# Patient Record
Sex: MALE | Race: WHITE | NOT HISPANIC OR LATINO | Employment: FULL TIME | URBAN - METROPOLITAN AREA
[De-identification: names, ages, dates, MRNs, and addresses within clinical notes are randomized per-mention and may not be internally consistent; named-entity substitution may affect disease eponyms.]

---

## 2017-03-30 ENCOUNTER — OFFICE VISIT (OUTPATIENT)
Dept: FAMILY MEDICINE | Facility: CLINIC | Age: 32
End: 2017-03-30
Payer: COMMERCIAL

## 2017-03-30 VITALS
SYSTOLIC BLOOD PRESSURE: 120 MMHG | DIASTOLIC BLOOD PRESSURE: 78 MMHG | RESPIRATION RATE: 16 BRPM | BODY MASS INDEX: 27.3 KG/M2 | WEIGHT: 184.31 LBS | HEIGHT: 69 IN

## 2017-03-30 DIAGNOSIS — Z23 NEED FOR TDAP VACCINATION: Primary | ICD-10-CM

## 2017-03-30 PROCEDURE — 90715 TDAP VACCINE 7 YRS/> IM: CPT | Mod: S$GLB,,, | Performed by: FAMILY MEDICINE

## 2017-03-30 PROCEDURE — 90471 IMMUNIZATION ADMIN: CPT | Mod: S$GLB,,, | Performed by: FAMILY MEDICINE

## 2017-03-30 PROCEDURE — 99203 OFFICE O/P NEW LOW 30 MIN: CPT | Mod: 25,S$GLB,, | Performed by: FAMILY MEDICINE

## 2017-03-30 PROCEDURE — 99999 PR PBB SHADOW E&M-EST. PATIENT-LVL III: CPT | Mod: PBBFAC,,, | Performed by: FAMILY MEDICINE

## 2017-03-30 PROCEDURE — 1160F RVW MEDS BY RX/DR IN RCRD: CPT | Mod: S$GLB,,, | Performed by: FAMILY MEDICINE

## 2017-03-30 RX ORDER — FLUOXETINE HYDROCHLORIDE 40 MG/1
40 CAPSULE ORAL DAILY
Refills: 1 | COMMUNITY
Start: 2017-03-25 | End: 2017-06-02 | Stop reason: SDUPTHER

## 2017-03-30 NOTE — MR AVS SNAPSHOT
"    [facility]  [address] [place] [address]  Phone: [phone]  Fax: [phone]                  Carlos Alfredo   3/30/2017 10:00 AM   Office Visit    Description:  Male : 1985   Provider:  Jael Mendoza MD   Department:  [facility]           Reason for Visit     Need for Whooping Cough Shot           Diagnoses this Visit        Comments    Need for Tdap vaccination    -  Primary            To Do List           Goals (5 Years of Data)     None      Follow-Up and Disposition     Return if symptoms worsen or fail to improve.      [facility]     [facility] On Call Nurse Care Line -  Assistance  Unless otherwise directed by your provider, please contact [facility], our nurse care line that is available for  assistance.     Registered nurses in the [facility] On Call Center provide: appointment scheduling, clinical advisement, health education, and other advisory services.  Call: [phone] (toll free)               Medications           Message regarding Medications     Verify the changes and/or additions to your medication regime listed below are the same as discussed with your clinician today.  If any of these changes or additions are incorrect, please notify your healthcare provider.             Verify that the below list of medications is an accurate representation of the medications you are currently taking.  If none reported, the list may be blank. If incorrect, please contact your healthcare provider. Carry this list with you in case of emergency.           Current Medications     fluoxetine (PROZAC) 40 MG capsule Take 40 mg by mouth once daily.           Clinical Reference Information           Your Vitals Were     BP Resp Height Weight BMI    120/78 (BP Location: Left arm, Patient Position: Sitting, BP Method: Manual) 16 5' 9" (1.753 m) 83.6 kg (184 lb 4.9 oz) 27.22 kg/m2      Blood Pressure          Most Recent Value    BP  120/78    "   Allergies as of 3/30/2017     No Known Allergies      Immunizations Administered on Date of Encounter - 3/30/2017     Name Date Dose VIS Date Route    TDAP  Incomplete 0.5 mL 2/24/2015 Intramuscular      Orders Placed During Today's Visit      Normal Orders This Visit    Tdap Vaccine       Language Assistance Services     ATTENTION: Language assistance services are available, free of charge. Please call [phone].      ATENCIÓN: Si habla español, tiene a ortiz disposición servicios gratuitos de asistencia lingüística. Llame al [phone].     CHÚ Ý: N?u b?n nói Ti?ng Vi?t, có các d?ch v? h? tr? ngôn ng? mi?n phí dành cho b?n. G?i s? [phone].         [facility] complies with applicable Federal civil rights laws and does not discriminate on the basis of race, color, national origin, age, disability, or sex.

## 2017-03-30 NOTE — PROGRESS NOTES
Subjective:       Patient ID: Carlos Alfredo is a 31 y.o. male.    Chief Complaint: Need for Whooping Cough Shot    HPI 31 year old male here for need for tdap. Patient states he can not remember the last time he had a tetanus. His wife is 26 weeks pregnant. No recent illnesses/fevers/n/v/diarrhea. Patient does not have a history of adverse reactions to vaccines that he can remember.     Review of Systems   Constitutional: Negative for chills and fever.   Respiratory: Negative for chest tightness and shortness of breath.    Cardiovascular: Negative for chest pain and leg swelling.   Gastrointestinal: Negative for abdominal pain, diarrhea, nausea and vomiting.   Psychiatric/Behavioral: Negative for agitation, behavioral problems, self-injury and suicidal ideas.       Objective:      Vitals:    03/30/17 1005   BP: 120/78   Resp: 16     Physical Exam   Constitutional: He is oriented to person, place, and time. He appears well-developed and well-nourished. No distress.   Cardiovascular: Normal rate and regular rhythm.    Pulmonary/Chest: Effort normal. He has no wheezes.   Musculoskeletal: He exhibits no edema or tenderness.   Neurological: He is alert and oriented to person, place, and time.   Skin: He is not diaphoretic.   Psychiatric: He has a normal mood and affect. His behavior is normal. Judgment and thought content normal.   Vitals reviewed.      Assessment:       1. Need for Tdap vaccination        Plan:         1. Tdap vaccine adminstered today. Possible adverse reactions reviewed with patient.   Need for Tdap vaccination    Other orders  -     Tdap Vaccine      Return if symptoms worsen or fail to improve.

## 2017-06-02 ENCOUNTER — OFFICE VISIT (OUTPATIENT)
Dept: PSYCHIATRY | Facility: CLINIC | Age: 32
End: 2017-06-02
Payer: COMMERCIAL

## 2017-06-02 VITALS
BODY MASS INDEX: 27.25 KG/M2 | HEIGHT: 69 IN | DIASTOLIC BLOOD PRESSURE: 92 MMHG | WEIGHT: 184 LBS | HEART RATE: 77 BPM | SYSTOLIC BLOOD PRESSURE: 126 MMHG

## 2017-06-02 DIAGNOSIS — F33.42 DEPRESSION, MAJOR, RECURRENT, IN COMPLETE REMISSION: Primary | ICD-10-CM

## 2017-06-02 PROCEDURE — 99212 OFFICE O/P EST SF 10 MIN: CPT | Mod: S$GLB,,, | Performed by: PSYCHIATRY & NEUROLOGY

## 2017-06-02 PROCEDURE — 99999 PR PBB SHADOW E&M-EST. PATIENT-LVL III: CPT | Mod: PBBFAC,,, | Performed by: PSYCHIATRY & NEUROLOGY

## 2017-06-02 RX ORDER — FLUOXETINE HYDROCHLORIDE 40 MG/1
40 CAPSULE ORAL DAILY
Qty: 90 CAPSULE | Refills: 4 | Status: SHIPPED | OUTPATIENT
Start: 2017-07-02 | End: 2017-11-07 | Stop reason: SDUPTHER

## 2017-06-02 NOTE — PROGRESS NOTES
"6/2/2017 9:46 AM   Carlos Alfredo   1985   [id number]           OUTPATIENT PSYCHIATRY FOLLOW- UP VISIT    Reason for Encounter:  Carlos Alfredo, a 32 y.o. male,who presents today for follow up of depression and anxiety. . Met with patient.    Interval History and Content of Current Session:    Today,  Pt reports improved mood and that he continues to not be depressed or anxious. He states that he is excited to have a baby but also nervous since he is a first time leonor. He states that he feels he is ready though. 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 He states that his relationship with wife is stable and has not had any new complains. He feels that his medication is working well for him and denied having any side effects. No other new medical problems or medications he was started on.                 Social stressors:  Work and becoming a new dad    Psychiatric Review Of Systems - Is patient experiencing or having changes in:    Symptoms of Depression: No longer has any diminished mood or loss of interest/anhedonia; irritability, diminished energy, change in sleep, change in appetite, diminished concentration or cognition or indecisiveness, PMA/R, excessive guilt or hopelessness or worthlessness, suicidal ideations    Symptoms of CARLY: No longer has any excessive anxiety/worry/fear, more days than not, about numerous issues, difficult to control, with restlessness, fatigue, poor concentration, irritability, muscle tension, sleep disturbance; causes functionally impairing distress     Symptoms of raul or hypomania: NO elevated, expansive, or irritable mood with increased energy or activity; with inflated self-esteem or grandiosity, decreased need for sleep, increased rate of speech, FOI or racing thoughts, distractibility, increased goal directed activity or PMA, risky/disinhibited behavior    Symptoms of psychosis: NO hallucinations, delusions, " "disorganized thinking, disorganized behavior or abnormal motor behavior, or negative symptoms (diminshed emotional expression, avolition, anhedonia, alogia, asociality     Sleep: NO Problems with initiation, maintenance, early morning awakening with inability to return to sleep      Risk Parameters:  Patient reports no suicidal ideation  Patient reports no homicidal ideation  Patient reports no self-injurious behavior  Patient reports no violent behavior    Psychotropic medication review  Previous Trials-  none    Current meds-  Prozac 40mg daily    Substance use  none    Review of Systems     Past Medical, Family and Social History: The patient's past medical, family and social history have been reviewed and updated as appropriate within the electronic medical record - see encounter notes.    Compliance: yes    Side effects: None      Objective     Constitutional  Vitals:  Most recent vital signs, dated less than 90 days prior to this appointment, were reviewed.    Vitals:    06/02/17 0918   BP: (!) 126/92   Pulse: 77   Weight: 83.5 kg (184 lb)   Height: 5' 9" (1.753 m)            Laboratory Data:none noted    Medications:  Outpatient Encounter Prescriptions as of 6/2/2017   Medication Sig Dispense Refill    [START ON 7/2/2017] fluoxetine (PROZAC) 40 MG capsule Take 1 capsule (40 mg total) by mouth once daily. 90 capsule 4    [DISCONTINUED] fluoxetine (PROZAC) 40 MG capsule Take 40 mg by mouth once daily.  1     No facility-administered encounter medications on file as of 6/2/2017.        Allergy:  Review of patient's allergies indicates:  No Known Allergies    Nutritional Screening: Considering the patient's height and weight, medications, medical history and preferences, should a referral be made to the dietitian? no    Review of Systems - History obtained from the patient  General ROS: negative for - chills, fatigue or fever  Respiratory ROS: no cough, shortness of breath, or wheezing  Cardiovascular ROS: no " "chest pain or dyspnea on exertion  Gastrointestinal ROS: no abdominal pain, change in bowel habits, or black or bloody stools  Musculoskeletal ROS:no dystonia, no tremor; non-ataxic  Neurological ROS: no TIA or stroke symptoms    Mental Status Exam:  Appearance: unremarkable, age appropriate, casually dressed  Behavior/Cooperation:appropriate friendly and cooperative  Speech: appropriate rate, volume and tone spontaneous  Language: grossly intact, uses words appropriately. No aphasia or dysarthria noted  Mood: " good "  Affect:   steady, euthymic congruent with mood and appropriate to situation/content   Thought Process: normal and logical  Thought Content: normal, no suicidality, no homicidality, delusions, or paranoia  Sensorium:  Awake  Alert and Oriented: x3 grossly intact  Memory: Intact to conversation both recent and remote  Attention/concentration: appropriate for age/education and intact to conversation  Insight: Intact  Judgment:Intact      Assessment and Diagnosis   Status/Progress: Based on the examination today, the patient's problem(s) is/are improved and well controlled.  New problems have not been presented today.   Co-morbidities, Diagnostic uncertainty and Lack of compliance are not complicating management of the primary condition.  There are no active rule-out diagnoses for this patient at this time.     General Impression:       ICD-10-CM ICD-9-CM   1. Depression, major, recurrent, in complete remission F33.42 296.36       Intervention/Counseling/Treatment Plan   · Medication Management: Continue current medications.   · Prozac 40mg daily- maintenance therapy for depression, pt at high risk for re-occurrence of depressive episode. Will recommend to continue   · The treatment plan and follow up plan were reviewed with the patient.  · Discussed with patient informed consent, risks vs. benefits, alternative treatments, side effect profile and the inherent unpredictability of individual responses to " these treatments. The patient expresses understanding of the above and displays the capacity to agree with this current plan and had no other questions.  · Encouraged Patient to keep future appointments.   · Take medications as prescribed and abstain from substance abuse.   · In the event of an emergency patient was advised to go to the emergency room.    Return to Clinic: 1yr    Coordination of care /Counseling time: > than 50% of total time was spend on this including reviewing recent medication changes, medical problems and social stressors.  Add on Psychotherapy time:0  Total Face time:20mins    KAYKAY SHULTZ MD   [facility]   6/2/2017 9:46 AM

## 2017-11-07 RX ORDER — FLUTICASONE PROPIONATE 50 MCG
2 SPRAY, SUSPENSION (ML) NASAL 2 TIMES DAILY
Qty: 1 BOTTLE | Refills: 5 | Status: SHIPPED | OUTPATIENT
Start: 2017-11-07

## 2017-11-07 RX ORDER — FLUOXETINE HYDROCHLORIDE 40 MG/1
40 CAPSULE ORAL DAILY
Qty: 90 CAPSULE | Refills: 1 | Status: SHIPPED | OUTPATIENT
Start: 2017-11-07 | End: 2018-05-01

## 2018-01-15 ENCOUNTER — OFFICE VISIT (OUTPATIENT)
Dept: INTERNAL MEDICINE | Facility: CLINIC | Age: 33
End: 2018-01-15
Payer: COMMERCIAL

## 2018-01-15 VITALS
RESPIRATION RATE: 14 BRPM | TEMPERATURE: 99 F | SYSTOLIC BLOOD PRESSURE: 140 MMHG | WEIGHT: 192.25 LBS | HEIGHT: 68 IN | BODY MASS INDEX: 29.14 KG/M2 | DIASTOLIC BLOOD PRESSURE: 86 MMHG | HEART RATE: 103 BPM

## 2018-01-15 DIAGNOSIS — R05.3 COUGH, PERSISTENT: ICD-10-CM

## 2018-01-15 DIAGNOSIS — J11.1 FLU SYNDROME: Primary | ICD-10-CM

## 2018-01-15 PROCEDURE — 99213 OFFICE O/P EST LOW 20 MIN: CPT | Mod: S$GLB,,, | Performed by: INTERNAL MEDICINE

## 2018-01-15 PROCEDURE — 99999 PR PBB SHADOW E&M-EST. PATIENT-LVL III: CPT | Mod: PBBFAC,,, | Performed by: INTERNAL MEDICINE

## 2018-01-15 RX ORDER — PROMETHAZINE HYDROCHLORIDE AND CODEINE PHOSPHATE 6.25; 1 MG/5ML; MG/5ML
5 SOLUTION ORAL EVERY 6 HOURS PRN
Qty: 180 ML | Refills: 0 | Status: SHIPPED | OUTPATIENT
Start: 2018-01-15 | End: 2018-04-10

## 2018-01-15 RX ORDER — AZELASTINE 1 MG/ML
1 SPRAY, METERED NASAL 2 TIMES DAILY
Qty: 30 ML | Refills: 11 | Status: SHIPPED | OUTPATIENT
Start: 2018-01-15 | End: 2018-11-08

## 2018-01-15 RX ORDER — OSELTAMIVIR PHOSPHATE 75 MG/1
75 CAPSULE ORAL 2 TIMES DAILY
Qty: 10 CAPSULE | Refills: 0 | Status: SHIPPED | OUTPATIENT
Start: 2018-01-15 | End: 2018-01-20

## 2018-01-15 NOTE — LETTER
January 15, 2018      [facility]  [address],  [address]  [place] [address]  Phone: [phone]  Fax: [phone]       Patient: Carlos Alfredo   YOB: 1985  Date of Visit: 01/15/2018    To Whom It May Concern:    Ciera Alfredo  was at [facility] on 01/15/2018. He may return to work/school on 1/19/2018 with no restrictions. If you have any questions or concerns, or if I can be of further assistance, please do not hesitate to contact me.    Sincerely,        Ashleigh Mckeon LPN

## 2018-01-15 NOTE — PROGRESS NOTES
"Subjective:      Patient ID: Carlos Alfredo is a 32 y.o. male.    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 Feeling progressively sicker.  Has a 7 month old daughter at home.      Review of Systems   Constitutional: Positive for activity change, chills, diaphoresis and fatigue. Negative for appetite change and fever.   HENT: Positive for congestion, rhinorrhea, sinus pain, sinus pressure, sore throat and voice change.    Eyes: Negative.    Respiratory: Positive for cough. Negative for choking, chest tightness, shortness of breath and wheezing.    Cardiovascular: Negative for chest pain and palpitations.   Gastrointestinal: Negative for constipation, diarrhea, nausea and vomiting.   Endocrine: Negative.    Genitourinary: Negative.    Musculoskeletal: Positive for myalgias. Negative for neck pain and neck stiffness.   Skin: Negative.    Neurological: Positive for weakness and headaches.   Psychiatric/Behavioral: Negative.        Objective:   BP (!) 140/86 (BP Location: Left arm, Patient Position: Sitting)   Pulse 103   Temp 98.6 °F (37 °C) (Oral)   Resp 14   Ht 5' 8" (1.727 m)   Wt 87.2 kg (192 lb 3.9 oz)   BMI 29.23 kg/m²     Physical Exam   Constitutional: He is oriented to person, place, and time. He appears well-developed and well-nourished. He appears ill. He appears distressed.   HENT:   Head: Normocephalic and atraumatic.   Right Ear: Tympanic membrane, external ear and ear canal normal.   Left Ear: Tympanic membrane, external ear and ear canal normal.   Nose: Nose normal.   Mouth/Throat: Uvula is midline and mucous membranes are normal. Posterior oropharyngeal edema and posterior oropharyngeal erythema present.   Eyes: Conjunctivae are normal. Pupils are equal, round, and reactive to light.   Neck: Normal range of motion.   Cardiovascular: " Normal rate, regular rhythm and normal heart sounds.    Pulmonary/Chest: Effort normal and breath sounds normal. No stridor.   Abdominal: Soft. Bowel sounds are normal.   Musculoskeletal: Normal range of motion.   Lymphadenopathy:     He has no cervical adenopathy.   Neurological: He is alert and oriented to person, place, and time.   Skin: Skin is warm and dry. There is pallor.   Psychiatric: He has a normal mood and affect. His behavior is normal.   Nursing note and vitals reviewed.      Assessment:     1. Flu syndrome    2. Cough, persistent    Precautions given.  Plan:     Flu syndrome  -     oseltamivir (TAMIFLU) 75 MG capsule; Take 1 capsule (75 mg total) by mouth 2 (two) times daily.  Dispense: 10 capsule; Refill: 0  -     azelastine (ASTELIN) 137 mcg (0.1 %) nasal spray; 1 spray (137 mcg total) by Nasal route 2 (two) times daily.  Dispense: 30 mL; Refill: 11    Cough, persistent  -     promethazine-codeine 6.25-10 mg/5 ml (PHENERGAN WITH CODEINE) 6.25-10 mg/5 mL syrup; Take 5 mLs by mouth every 6 (six) hours as needed for Cough.  Dispense: 180 mL; Refill: 0    Isolate,rest,fluids,gargles, nasal decongestants,tylenol,ibuprofen alternating.

## 2018-01-16 ENCOUNTER — PATIENT MESSAGE (OUTPATIENT)
Dept: INTERNAL MEDICINE | Facility: CLINIC | Age: 33
End: 2018-01-16

## 2018-01-17 ENCOUNTER — TELEPHONE (OUTPATIENT)
Dept: INTERNAL MEDICINE | Facility: CLINIC | Age: 33
End: 2018-01-17

## 2018-01-17 RX ORDER — CEFUROXIME AXETIL 500 MG/1
500 TABLET ORAL EVERY 12 HOURS
Qty: 20 TABLET | Refills: 0 | Status: SHIPPED | OUTPATIENT
Start: 2018-01-17 | End: 2018-04-10

## 2018-01-17 NOTE — TELEPHONE ENCOUNTER
Patient states that he can't take Tamiflu. It caused him to vomit 6 times. He believes that he has a sinus infection and would like to know if you can call in antibiotics? Please advise

## 2018-01-17 NOTE — TELEPHONE ENCOUNTER
----- Message from Madelin Brown sent at 1/17/2018  8:49 AM CST -----  Contact: [phone]/ self   Pt called stating she was seen on 01/15/18 and was prescribed Tamiflu . Pt states he took the medication and felt sick to his stomach . Pt want to know if something else can be sent tot his pharmacy . Please advise

## 2018-04-10 ENCOUNTER — PATIENT MESSAGE (OUTPATIENT)
Dept: FAMILY MEDICINE | Facility: CLINIC | Age: 33
End: 2018-04-10

## 2018-04-10 ENCOUNTER — OFFICE VISIT (OUTPATIENT)
Dept: FAMILY MEDICINE | Facility: CLINIC | Age: 33
End: 2018-04-10
Payer: COMMERCIAL

## 2018-04-10 VITALS
SYSTOLIC BLOOD PRESSURE: 120 MMHG | WEIGHT: 176.88 LBS | OXYGEN SATURATION: 98 % | HEIGHT: 68 IN | DIASTOLIC BLOOD PRESSURE: 82 MMHG | BODY MASS INDEX: 26.81 KG/M2 | HEART RATE: 68 BPM

## 2018-04-10 DIAGNOSIS — E78.2 HYPERLIPIDEMIA, MIXED: Primary | ICD-10-CM

## 2018-04-10 DIAGNOSIS — F33.42 DEPRESSION, MAJOR, RECURRENT, IN COMPLETE REMISSION: ICD-10-CM

## 2018-04-10 DIAGNOSIS — Z83.3 FAMILY HISTORY OF DIABETES MELLITUS IN MOTHER: ICD-10-CM

## 2018-04-10 PROCEDURE — 99213 OFFICE O/P EST LOW 20 MIN: CPT | Mod: S$GLB,,, | Performed by: INTERNAL MEDICINE

## 2018-04-10 PROCEDURE — 99999 PR PBB SHADOW E&M-EST. PATIENT-LVL III: CPT | Mod: PBBFAC,,, | Performed by: INTERNAL MEDICINE

## 2018-04-10 RX ORDER — ROSUVASTATIN CALCIUM 10 MG/1
10 TABLET, COATED ORAL DAILY
Qty: 30 TABLET | Refills: 5 | Status: SHIPPED | OUTPATIENT
Start: 2018-04-10 | End: 2018-09-27 | Stop reason: SDUPTHER

## 2018-04-10 NOTE — PROGRESS NOTES
Subjective:       Patient ID: Carlos Alfredo is a 32 y.o. male.    Chief Complaint: Hyperlipidemia    This is a new patient to me.  I have reviewed the PMH, SH and FH for this patient. He has a history of high cholesterol. He recently had a screen at work which revealed that he had very high cholesterol of 292 with TG's over 600 and LDL unable to calculate. He said that he had some fried chicken the night before. He is fasting today. He has never taken medicine for high cholesterol. He does have a family history of diabetes in his mother.       Hyperlipidemia   This is a chronic problem. The current episode started more than 1 year ago. The problem is uncontrolled. Recent lipid tests were reviewed and are high. He has no history of chronic renal disease, diabetes, hypothyroidism, liver disease, obesity or nephrotic syndrome. Factors aggravating his hyperlipidemia include fatty foods. Pertinent negatives include no chest pain, focal sensory loss, focal weakness, leg pain, myalgias or shortness of breath. He is currently on no antihyperlipidemic treatment. Risk factors for coronary artery disease include male sex.     Review of Systems   Constitutional: Negative for activity change, chills, fatigue, fever and unexpected weight change.   HENT: Negative for congestion, ear discharge, ear pain, hearing loss, rhinorrhea, sore throat and trouble swallowing.    Eyes: Negative for discharge, redness, itching and visual disturbance.   Respiratory: Negative for cough, chest tightness, shortness of breath and wheezing.    Cardiovascular: Negative for chest pain, palpitations and leg swelling.   Gastrointestinal: Negative for abdominal pain, blood in stool, constipation, diarrhea, nausea and vomiting.   Endocrine: Negative for cold intolerance, heat intolerance, polydipsia and polyuria.   Genitourinary: Negative for difficulty urinating, dysuria, flank pain, frequency, hematuria and urgency.   Musculoskeletal: Negative for  arthralgias, back pain, joint swelling, myalgias and neck pain.   Skin: Negative for color change and rash.   Neurological: Negative for dizziness, tremors, focal weakness, weakness, numbness and headaches.   Psychiatric/Behavioral: Negative for confusion, dysphoric mood and sleep disturbance. The patient is not nervous/anxious.        Objective:      Physical Exam   Constitutional: He appears well-developed and well-nourished.   HENT:   Head: Normocephalic and atraumatic.   Right Ear: External ear normal. Tympanic membrane is not erythematous. No middle ear effusion.   Left Ear: External ear normal. Tympanic membrane is not erythematous.  No middle ear effusion.   Mouth/Throat: No posterior oropharyngeal edema or posterior oropharyngeal erythema. No tonsillar exudate.   Eyes: Conjunctivae and EOM are normal. Pupils are equal, round, and reactive to light.   Neck: No thyromegaly present.   Cardiovascular: Normal rate, regular rhythm, normal heart sounds and intact distal pulses.    No murmur heard.  Pulmonary/Chest: Effort normal and breath sounds normal. He has no wheezes.   Abdominal: He exhibits no distension. There is no tenderness.   Musculoskeletal: He exhibits no edema or tenderness.   Lymphadenopathy:     He has no cervical adenopathy.   Neurological: He displays normal reflexes. No cranial nerve deficit.   Skin: Skin is warm and dry. No rash noted.   Psychiatric: He has a normal mood and affect. His behavior is normal.       Assessment and Plan:     Problem List Items Addressed This Visit     Depression, major, recurrent, in complete remission - HE sees psychiatry. He is doing well on prozac.     Hyperlipidemia, mixed - Primary (chronic) - we will repeat levels . I would like to go ahead and start him on crestor. We will recheck in 1 month.     Relevant Orders    Comprehensive metabolic panel (Completed)    Lipid panel    Family history of diabetes mellitus in mother - we will check blood chemistries.        Other Visit Diagnoses     BMI 26.0-26.9,adult    - work on diet.

## 2018-04-10 NOTE — PATIENT INSTRUCTIONS
We will check your cholesterol and a metabolic panel again today. I have prescribed crestor once a day in the evening. Please schedule to see me in a month for follow-up.

## 2018-05-01 ENCOUNTER — OFFICE VISIT (OUTPATIENT)
Dept: PSYCHIATRY | Facility: CLINIC | Age: 33
End: 2018-05-01
Payer: COMMERCIAL

## 2018-05-01 VITALS
BODY MASS INDEX: 26.51 KG/M2 | DIASTOLIC BLOOD PRESSURE: 86 MMHG | HEIGHT: 68 IN | WEIGHT: 174.94 LBS | HEART RATE: 71 BPM | SYSTOLIC BLOOD PRESSURE: 132 MMHG

## 2018-05-01 DIAGNOSIS — F33.42 DEPRESSION, MAJOR, RECURRENT, IN COMPLETE REMISSION: Primary | ICD-10-CM

## 2018-05-01 PROCEDURE — 99213 OFFICE O/P EST LOW 20 MIN: CPT | Mod: S$GLB,,, | Performed by: PSYCHIATRY & NEUROLOGY

## 2018-05-01 PROCEDURE — 99999 PR PBB SHADOW E&M-EST. PATIENT-LVL III: CPT | Mod: PBBFAC,,, | Performed by: PSYCHIATRY & NEUROLOGY

## 2018-05-01 PROCEDURE — 90833 PSYTX W PT W E/M 30 MIN: CPT | Mod: S$GLB,,, | Performed by: PSYCHIATRY & NEUROLOGY

## 2018-05-01 RX ORDER — FLUOXETINE HYDROCHLORIDE 40 MG/1
40 CAPSULE ORAL DAILY
Qty: 90 CAPSULE | Refills: 1 | Status: SHIPPED | OUTPATIENT
Start: 2018-05-01 | End: 2018-10-31 | Stop reason: SDUPTHER

## 2018-05-01 NOTE — PROGRESS NOTES
5/1/2018 9:07 AM   Carlos Alfredo   1985   [id number]           OUTPATIENT PSYCHIATRY FOLLOW- UP VISIT    Reason for Encounter:  Carlos Alfredo, a 33 y.o. male,who presents today for follow up of   Chief Complaint   Patient presents with    Depression   .  Met with patient.    Interval History and Content of Current Session:    Today,  Pt reports  He is doing well discussed how its stressful having a new baby and complains of sleep depravation. States that he is experiencing some irritability towards wife especially when both are sleep depraved.   He states that he is doing well and is afraid to get off the Prozac. Encouraged pt now may not be an opportune time to d/c prozac as he has increased stress with care take of the baby. Discussed potential risks of long term use and risks vs. Benefits.               Social stressors:  Having a 11 month old baby      PSYCHOTHERAPY ADD-ON +[id number]   30 (16-37*) minutes    Time: 16  minutes  Participants: Met with patient    Therapeutic Intervention Type: behavior modifying psychotherapy, supportive psychotherapy  Why chosen therapy is appropriate versus another modality: relevant to diagnosis, patient responds to this modality, evidence based practice    Target symptoms: adjustment, relationship conflicts  Psychotherapeutic techniques: supportive insight oriented and CBT    Outcome monitoring methods: self-report, observation    Patient's response to intervention:  The patient's response to intervention is accepting, motivated.    Progress toward goals:  The patient's progress toward goals is good.    Psychiatric Review Of Systems - Is patient experiencing or having changes in:    Symptoms of Depression: No longer has any diminished mood or loss of interest/anhedonia; irritability, diminished energy, change in sleep, change in appetite, diminished concentration or cognition or indecisiveness, PMA/R, excessive guilt or hopelessness or worthlessness, suicidal  "ideations     Symptoms of CARLY: No longer has any excessive anxiety/worry/fear, more days than not, about numerous issues, difficult to control, with restlessness, fatigue, poor concentration, irritability, muscle tension, sleep disturbance; causes functionally impairing distress      Symptoms of raul or hypomania: NO elevated, expansive, or irritable mood with increased energy or activity; with inflated self-esteem or grandiosity, decreased need for sleep, increased rate of speech, FOI or racing thoughts, distractibility, increased goal directed activity or PMA, risky/disinhibited behavior     Symptoms of psychosis: NO hallucinations, delusions, disorganized thinking, disorganized behavior or abnormal motor behavior, or negative symptoms (diminshed emotional expression, avolition, anhedonia, alogia, asociality      Sleep: NO Problems with initiation, maintenance, early morning awakening with inability to return to sleep        Risk Parameters:  Patient reports no suicidal ideation  Patient reports no homicidal ideation  Patient reports no self-injurious behavior  Patient reports no violent behavior     Psychotropic medication review  Previous Trials-  none     Current meds-  Prozac 40mg daily    Review of Systems     Past Medical, Family and Social History: The patient's past medical, family and social history have been reviewed and updated as appropriate within the electronic medical record - see encounter notes.    Compliance: yes    Side effects: None      Objective     Constitutional  Vitals:  Most recent vital signs, dated less than 90 days prior to this appointment, were reviewed.    Vitals:    05/01/18 0848   BP: 132/86   Pulse: 71   Weight: 79.3 kg (174 lb 15 oz)   Height: 5' 8" (1.727 m)            Laboratory Data: reviewed most recent labs and noted any abnormalities.    Medications:  Outpatient Encounter Prescriptions as of 5/1/2018   Medication Sig Dispense Refill    azelastine (ASTELIN) 137 mcg (0.1 " "%) nasal spray 1 spray (137 mcg total) by Nasal route 2 (two) times daily. 30 mL 11    FLUoxetine (PROZAC) 40 MG capsule Take 1 capsule (40 mg total) by mouth once daily. 90 capsule 1    fluticasone (FLONASE) 50 mcg/actuation nasal spray 2 sprays by Each Nare route 2 (two) times daily. 1 Bottle 5    rosuvastatin (CRESTOR) 10 MG tablet Take 1 tablet (10 mg total) by mouth once daily. 30 tablet 5     No facility-administered encounter medications on file as of 5/1/2018.        Allergy:  Review of patient's allergies indicates:  No Known Allergies    Nutritional Screening: Considering the patient's height and weight, medications, medical history and preferences, should a referral be made to the dietitian? no    Review of Systems - History obtained from the patient  General ROS: negative  Respiratory ROS: no cough, shortness of breath, or wheezing  Cardiovascular ROS: no chest pain or dyspnea on exertion  Gastrointestinal ROS: no abdominal pain, change in bowel habits, or black or bloody stools  Musculoskeletal ROS:no dystonia, no tremor; non-ataxic  Neurological ROS: no TIA or stroke symptoms    Mental Status Exam:  Appearance: unremarkable, age appropriate, casually dressed  Behavior/Cooperation:appropriate friendly and cooperative   Speech: appropriate rate, volume and tone spontaneous  Language: uses words appropriately; NO aphasia or dysarthria  Mood: "  ok  "  Affect:   euthymic congruent with mood and appropriate to situation/content   Thought Process:  normal and logical  Thought Content: normal, no suicidality, no homicidality, delusions, or paranoia  Sensorium:  Awake  Alert and Oriented: x3 grossly intact  Memory: Intact to conversation both recent and remote  Attention/concentration: appropriate for age/education and intact to conversation  Insight: Intact  Judgment:Intact      Assessment and Diagnosis   Status/Progress: Based on the examination today, the patient's problem(s) is/are well controlled.  New " problems have been presented today.   Co-morbidities are not complicating management of the primary condition.  There are no active rule-out diagnoses for this patient at this time.     General Impression:       ICD-10-CM ICD-9-CM   1. Depression, major, recurrent, in complete remission F33.42 296.36       Intervention/Counseling/Treatment Plan   · Medication Management: -  · Continue current med regimen  · Labs: reviewed most recent  · The treatment plan and follow up plan were reviewed with the patient.  · Discussed with patient informed consent, risks vs. benefits, alternative treatments, side effect profile and the inherent unpredictability of individual responses to these treatments. The patient expresses understanding of the above and displays the capacity to agree with this current plan and had no other questions.  · Encouraged Patient to keep future appointments.   · Take medications as prescribed and abstain from substance abuse.   · In the event of an emergency patient was advised to go to the emergency room.    Return to Clinic: 6 months    > than 50% of total time spend on coordination of care and counseling   (which included pts differential diagnosis and prognosis for psychiatric conditions, risks, benefits of treatments, instructions and adherence to treatment plan, risk reduction, reviewing current psychiatric medication regimen, medical problems and social stressors. In addtion to possible discussion with other healthcare provider/s)    Add on Psychotherapy time:16mins  Total Face time:30mins    KAYKAY SHULTZ MD   [facility]   5/1/2018 9:07 AM

## 2018-05-08 ENCOUNTER — OFFICE VISIT (OUTPATIENT)
Dept: FAMILY MEDICINE | Facility: CLINIC | Age: 33
End: 2018-05-08
Payer: COMMERCIAL

## 2018-05-08 VITALS
OXYGEN SATURATION: 98 % | HEART RATE: 75 BPM | TEMPERATURE: 98 F | BODY MASS INDEX: 26.12 KG/M2 | WEIGHT: 172.31 LBS | DIASTOLIC BLOOD PRESSURE: 80 MMHG | HEIGHT: 68 IN | SYSTOLIC BLOOD PRESSURE: 120 MMHG

## 2018-05-08 DIAGNOSIS — R79.89 ABNORMAL LIVER FUNCTION TESTS: ICD-10-CM

## 2018-05-08 DIAGNOSIS — F33.42 DEPRESSION, MAJOR, RECURRENT, IN COMPLETE REMISSION: ICD-10-CM

## 2018-05-08 DIAGNOSIS — E78.2 HYPERLIPIDEMIA, MIXED: Primary | ICD-10-CM

## 2018-05-08 DIAGNOSIS — Z79.899 ENCOUNTER FOR LONG-TERM (CURRENT) USE OF MEDICATIONS: ICD-10-CM

## 2018-05-08 PROCEDURE — 3008F BODY MASS INDEX DOCD: CPT | Mod: CPTII,S$GLB,, | Performed by: INTERNAL MEDICINE

## 2018-05-08 PROCEDURE — 99213 OFFICE O/P EST LOW 20 MIN: CPT | Mod: S$GLB,,, | Performed by: INTERNAL MEDICINE

## 2018-05-08 PROCEDURE — 99999 PR PBB SHADOW E&M-EST. PATIENT-LVL III: CPT | Mod: PBBFAC,,, | Performed by: INTERNAL MEDICINE

## 2018-05-08 NOTE — PROGRESS NOTES
Subjective:       Patient ID: Carlos Alfredo is a 33 y.o. male.    Chief Complaint: Hyperlipidemia (f/u)     I have reviewed the PMH, SH and FH for this patient. He presents today for follow-up of hyperlipidemia and abnormal liver tests. We started him on crestor last time because his ldl was 162. HE has been doing well on it. He states that he does not drink alcohol or use a lot of tylenol, so he does not know why he would have high LFT's. He has been working on diet and has lost 20 pounds in the last 4 months. HE has been seeing a counselor for depression and he still takes prozac.       Hyperlipidemia   This is a chronic problem. The current episode started more than 1 year ago. Recent lipid tests were reviewed and are high. Exacerbating diseases include liver disease. There are no known factors aggravating his hyperlipidemia. Pertinent negatives include no chest pain, myalgias or shortness of breath. Current antihyperlipidemic treatment includes statins. There are no compliance problems.  Risk factors for coronary artery disease include male sex.     Review of Systems   Constitutional: Negative for activity change, chills, fatigue, fever and unexpected weight change.   HENT: Negative for congestion, ear discharge, ear pain, hearing loss, rhinorrhea, sore throat and trouble swallowing.    Eyes: Negative for discharge, redness, itching and visual disturbance.   Respiratory: Negative for cough, chest tightness, shortness of breath and wheezing.    Cardiovascular: Negative for chest pain, palpitations and leg swelling.   Gastrointestinal: Negative for abdominal pain, blood in stool, constipation, diarrhea, nausea and vomiting.   Endocrine: Negative for cold intolerance, heat intolerance, polydipsia and polyuria.   Genitourinary: Negative for difficulty urinating, dysuria, flank pain, frequency, hematuria and urgency.   Musculoskeletal: Negative for arthralgias, back pain, joint swelling, myalgias and neck pain.    Skin: Negative for color change and rash.   Neurological: Negative for dizziness, tremors, weakness, numbness and headaches.   Psychiatric/Behavioral: Negative for confusion, dysphoric mood and sleep disturbance. The patient is not nervous/anxious.        Objective:      Physical Exam   Constitutional: He appears well-developed and well-nourished.   HENT:   Head: Normocephalic and atraumatic.   Right Ear: External ear normal. Tympanic membrane is not erythematous. No middle ear effusion.   Left Ear: External ear normal. Tympanic membrane is not erythematous.  No middle ear effusion.   Mouth/Throat: No posterior oropharyngeal edema or posterior oropharyngeal erythema. No tonsillar exudate.   Eyes: Conjunctivae and EOM are normal. Pupils are equal, round, and reactive to light.   Neck: No thyromegaly present.   Cardiovascular: Normal rate, regular rhythm, normal heart sounds and intact distal pulses.    No murmur heard.  Pulmonary/Chest: Effort normal and breath sounds normal. He has no wheezes.   Abdominal: He exhibits no distension. There is no tenderness.   Musculoskeletal: He exhibits no edema or tenderness.   Lymphadenopathy:     He has no cervical adenopathy.   Neurological: He displays normal reflexes. No cranial nerve deficit.   Skin: Skin is warm and dry. No rash noted.   Psychiatric: He has a normal mood and affect. His behavior is normal.       Assessment and Plan:     Problem List Items Addressed This Visit     Depression, major, recurrent, in complete remission - continue seeing counselor and taking prozac for now.       Hyperlipidemia, mixed - Primary (Chronic) - We started him on crestor. Recheck labs.     Relevant Orders    Lipid panel    Abnormal liver function tests - uncertain cause . He does have tattoos, so we will check hepatitis panel.     Relevant Orders    Hepatic function panel    HEPATITIS PANEL, ACUTE      Other Visit Diagnoses     Encounter for long-term (current) use of medications    -  We will check liver tests again.

## 2018-05-08 NOTE — PATIENT INSTRUCTIONS
We will check your liver functions and cholesterol again. We will also check hepatitis panel because of abnormal liver function test and history of tattoos. Continue to see counselor and continue prozac for now.

## 2018-09-27 ENCOUNTER — PATIENT MESSAGE (OUTPATIENT)
Dept: FAMILY MEDICINE | Facility: CLINIC | Age: 33
End: 2018-09-27

## 2018-09-27 RX ORDER — ROSUVASTATIN CALCIUM 10 MG/1
10 TABLET, COATED ORAL DAILY
Qty: 30 TABLET | Refills: 2 | Status: SHIPPED | OUTPATIENT
Start: 2018-09-27 | End: 2019-01-06 | Stop reason: SDUPTHER

## 2018-10-31 RX ORDER — FLUOXETINE HYDROCHLORIDE 40 MG/1
CAPSULE ORAL
Qty: 90 CAPSULE | Refills: 0 | Status: SHIPPED | OUTPATIENT
Start: 2018-10-31 | End: 2018-11-11 | Stop reason: SDUPTHER

## 2018-11-08 ENCOUNTER — OFFICE VISIT (OUTPATIENT)
Dept: FAMILY MEDICINE | Facility: CLINIC | Age: 33
End: 2018-11-08
Payer: COMMERCIAL

## 2018-11-08 VITALS
WEIGHT: 161.13 LBS | HEART RATE: 68 BPM | BODY MASS INDEX: 24.42 KG/M2 | OXYGEN SATURATION: 98 % | SYSTOLIC BLOOD PRESSURE: 120 MMHG | HEIGHT: 68 IN | DIASTOLIC BLOOD PRESSURE: 80 MMHG | TEMPERATURE: 98 F

## 2018-11-08 DIAGNOSIS — F33.42 DEPRESSION, MAJOR, RECURRENT, IN COMPLETE REMISSION: ICD-10-CM

## 2018-11-08 DIAGNOSIS — E78.2 HYPERLIPIDEMIA, MIXED: Primary | ICD-10-CM

## 2018-11-08 DIAGNOSIS — Z79.899 LONG-TERM USE OF HIGH-RISK MEDICATION: ICD-10-CM

## 2018-11-08 DIAGNOSIS — R79.89 ABNORMAL LIVER FUNCTION TESTS: ICD-10-CM

## 2018-11-08 PROBLEM — Z23 NEED FOR TDAP VACCINATION: Status: RESOLVED | Noted: 2017-03-30 | Resolved: 2018-11-08

## 2018-11-08 PROCEDURE — 99214 OFFICE O/P EST MOD 30 MIN: CPT | Mod: S$GLB,,, | Performed by: INTERNAL MEDICINE

## 2018-11-08 PROCEDURE — 99999 PR PBB SHADOW E&M-EST. PATIENT-LVL IV: CPT | Mod: PBBFAC,,, | Performed by: INTERNAL MEDICINE

## 2018-11-08 PROCEDURE — 3008F BODY MASS INDEX DOCD: CPT | Mod: CPTII,S$GLB,, | Performed by: INTERNAL MEDICINE

## 2018-11-08 NOTE — PATIENT INSTRUCTIONS
We have reviewed your prescription medications. We will recheck liver tests today. Continue crestor since you are not having any side effects. I would also like you to continue the prozac/fluoxetine. Good job on losing weight! We will try to update your record to show that you had your flu shot.

## 2018-11-09 NOTE — PROGRESS NOTES
Subjective:       Patient ID: Carlos Alfredo is a 33 y.o. male.    Chief Complaint: Follow-up     I have reviewed the PM,  and  for this patient. He is here for follow-up of chronic conditions. He has been taking prozac for mood disorder and it is doing well. His last labs showed abnormal liver tests. He has lost some weight to see if that will help. He has been eating better. He had his flu shot at work. He doesn't drink alcohol.       Review of Systems   Constitutional: Negative for activity change, chills, fatigue, fever and unexpected weight change.   HENT: Negative for congestion, ear discharge, ear pain, hearing loss, rhinorrhea, sore throat and trouble swallowing.    Eyes: Negative for discharge, redness, itching and visual disturbance.   Respiratory: Negative for cough, chest tightness, shortness of breath and wheezing.    Cardiovascular: Negative for chest pain, palpitations and leg swelling.   Gastrointestinal: Negative for abdominal pain, blood in stool, constipation, diarrhea, nausea and vomiting.   Endocrine: Negative for cold intolerance, heat intolerance, polydipsia and polyuria.   Genitourinary: Negative for difficulty urinating, dysuria, flank pain, frequency, hematuria and urgency.   Musculoskeletal: Negative for arthralgias, back pain, joint swelling, myalgias and neck pain.   Skin: Negative for color change and rash.   Neurological: Negative for dizziness, tremors, weakness, numbness and headaches.   Psychiatric/Behavioral: Negative for confusion, dysphoric mood and sleep disturbance. The patient is not nervous/anxious.        Objective:      Physical Exam   Constitutional: He appears well-developed and well-nourished.   HENT:   Head: Normocephalic and atraumatic.   Right Ear: External ear normal. Tympanic membrane is not erythematous. No middle ear effusion.   Left Ear: External ear normal. Tympanic membrane is not erythematous.  No middle ear effusion.   Mouth/Throat: No posterior  oropharyngeal edema or posterior oropharyngeal erythema. No tonsillar exudate.   Eyes: Conjunctivae and EOM are normal. Pupils are equal, round, and reactive to light.   Neck: No thyromegaly present.   Cardiovascular: Normal rate, regular rhythm, normal heart sounds and intact distal pulses.   No murmur heard.  Pulmonary/Chest: Effort normal and breath sounds normal. He has no wheezes.   Abdominal: He exhibits no distension. There is no tenderness.   Musculoskeletal: He exhibits no edema or tenderness.   Lymphadenopathy:     He has no cervical adenopathy.   Neurological: He displays normal reflexes. No cranial nerve deficit.   Skin: Skin is warm and dry. No rash noted.   Psychiatric: He has a normal mood and affect. His behavior is normal.       Assessment and Plan:     Problem List Items Addressed This Visit        Psychiatric    Depression, major, recurrent, in complete remission - doing well on prozac. Continue it.        Cardiac/Vascular    Hyperlipidemia, mixed - Primary (Chronic) - started on crestor. No side effects. Recheck labs.     Relevant Orders    Comprehensive metabolic panel (Completed)    Lipid panel (Completed)       GI    Abnormal liver function tests - recheck. Lost weight.        Other    Long-term use of high-risk medication - check labs.     Relevant Orders    CBC auto differential (Completed)

## 2018-11-12 RX ORDER — FLUOXETINE HYDROCHLORIDE 40 MG/1
CAPSULE ORAL
Qty: 30 CAPSULE | Refills: 0 | Status: SHIPPED | OUTPATIENT
Start: 2018-11-12 | End: 2019-02-15 | Stop reason: SDUPTHER

## 2019-01-06 RX ORDER — ROSUVASTATIN CALCIUM 10 MG/1
TABLET, COATED ORAL
Qty: 30 TABLET | Refills: 4 | Status: SHIPPED | OUTPATIENT
Start: 2019-01-06 | End: 2019-04-30 | Stop reason: SDUPTHER

## 2019-02-15 RX ORDER — FLUOXETINE HYDROCHLORIDE 40 MG/1
CAPSULE ORAL
Qty: 30 CAPSULE | Refills: 0 | Status: SHIPPED | OUTPATIENT
Start: 2019-02-15 | End: 2019-08-08 | Stop reason: SDUPTHER

## 2019-03-19 RX ORDER — FLUOXETINE HYDROCHLORIDE 40 MG/1
CAPSULE ORAL
Qty: 30 CAPSULE | Refills: 0 | OUTPATIENT
Start: 2019-03-19

## 2019-03-23 RX ORDER — FLUOXETINE HYDROCHLORIDE 40 MG/1
CAPSULE ORAL
Qty: 30 CAPSULE | Refills: 0 | OUTPATIENT
Start: 2019-03-23

## 2019-04-10 ENCOUNTER — PATIENT MESSAGE (OUTPATIENT)
Dept: PSYCHIATRY | Facility: HOSPITAL | Age: 34
End: 2019-04-10

## 2019-04-10 RX ORDER — FLUOXETINE HYDROCHLORIDE 40 MG/1
CAPSULE ORAL
Qty: 30 CAPSULE | Refills: 0 | OUTPATIENT
Start: 2019-04-10

## 2019-04-30 RX ORDER — ROSUVASTATIN CALCIUM 10 MG/1
10 TABLET, COATED ORAL DAILY
Qty: 30 TABLET | Refills: 0 | Status: SHIPPED | OUTPATIENT
Start: 2019-04-30 | End: 2019-05-24 | Stop reason: SDUPTHER

## 2019-04-30 NOTE — TELEPHONE ENCOUNTER
Spoke with patient. Pt was concerned that he would run out of [facility] before being able to see Dr Gamble. Pt requests that his medication be refilled until he could get in. Pt also states that he would be out of town starting next week.

## 2019-04-30 NOTE — TELEPHONE ENCOUNTER
"----- Message from Andrew Bingham sent at 4/30/2019  8:11 AM CDT -----  Contact: SELF  [phone]  "I need medication refill.  I need an appointment very soon this week, if possible. Please call me back."    Thanks.  "

## 2019-04-30 NOTE — TELEPHONE ENCOUNTER
"----- Message from Andrew Bingham sent at 4/30/2019  8:11 AM CDT -----  Contact: SELF  [phone]  "I need medication refill.  I need an appointment very soon this week, if possible. Please call me back."    Thanks.  "

## 2019-05-23 ENCOUNTER — PATIENT MESSAGE (OUTPATIENT)
Dept: FAMILY MEDICINE | Facility: CLINIC | Age: 34
End: 2019-05-23

## 2019-05-24 RX ORDER — ROSUVASTATIN CALCIUM 10 MG/1
10 TABLET, COATED ORAL DAILY
Qty: 30 TABLET | Refills: 2 | Status: SHIPPED | OUTPATIENT
Start: 2019-05-24 | End: 2019-08-08 | Stop reason: SDUPTHER

## 2019-06-02 ENCOUNTER — PATIENT MESSAGE (OUTPATIENT)
Dept: FAMILY MEDICINE | Facility: CLINIC | Age: 34
End: 2019-06-02

## 2019-07-22 ENCOUNTER — PATIENT MESSAGE (OUTPATIENT)
Dept: FAMILY MEDICINE | Facility: CLINIC | Age: 34
End: 2019-07-22

## 2019-08-08 ENCOUNTER — OFFICE VISIT (OUTPATIENT)
Dept: FAMILY MEDICINE | Facility: CLINIC | Age: 34
End: 2019-08-08
Payer: COMMERCIAL

## 2019-08-08 VITALS
TEMPERATURE: 98 F | OXYGEN SATURATION: 98 % | SYSTOLIC BLOOD PRESSURE: 122 MMHG | HEART RATE: 83 BPM | BODY MASS INDEX: 24.19 KG/M2 | HEIGHT: 68 IN | DIASTOLIC BLOOD PRESSURE: 82 MMHG | WEIGHT: 159.63 LBS

## 2019-08-08 DIAGNOSIS — Z79.899 LONG-TERM USE OF HIGH-RISK MEDICATION: ICD-10-CM

## 2019-08-08 DIAGNOSIS — E78.2 HYPERLIPIDEMIA, MIXED: Primary | Chronic | ICD-10-CM

## 2019-08-08 DIAGNOSIS — H65.03 BILATERAL ACUTE SEROUS OTITIS MEDIA, RECURRENCE NOT SPECIFIED: ICD-10-CM

## 2019-08-08 DIAGNOSIS — J30.9 ALLERGIC RHINITIS, UNSPECIFIED SEASONALITY, UNSPECIFIED TRIGGER: ICD-10-CM

## 2019-08-08 DIAGNOSIS — F33.42 DEPRESSION, MAJOR, RECURRENT, IN COMPLETE REMISSION: ICD-10-CM

## 2019-08-08 PROCEDURE — 99999 PR PBB SHADOW E&M-EST. PATIENT-LVL IV: ICD-10-PCS | Mod: PBBFAC,,, | Performed by: INTERNAL MEDICINE

## 2019-08-08 PROCEDURE — 3008F BODY MASS INDEX DOCD: CPT | Mod: CPTII,S$GLB,, | Performed by: INTERNAL MEDICINE

## 2019-08-08 PROCEDURE — 99999 PR PBB SHADOW E&M-EST. PATIENT-LVL IV: CPT | Mod: PBBFAC,,, | Performed by: INTERNAL MEDICINE

## 2019-08-08 PROCEDURE — 99214 OFFICE O/P EST MOD 30 MIN: CPT | Mod: S$GLB,,, | Performed by: INTERNAL MEDICINE

## 2019-08-08 PROCEDURE — 99214 PR OFFICE/OUTPT VISIT, EST, LEVL IV, 30-39 MIN: ICD-10-PCS | Mod: S$GLB,,, | Performed by: INTERNAL MEDICINE

## 2019-08-08 PROCEDURE — 3008F PR BODY MASS INDEX (BMI) DOCUMENTED: ICD-10-PCS | Mod: CPTII,S$GLB,, | Performed by: INTERNAL MEDICINE

## 2019-08-08 RX ORDER — ROSUVASTATIN CALCIUM 10 MG/1
10 TABLET, COATED ORAL DAILY
Qty: 90 TABLET | Refills: 1 | Status: SHIPPED | OUTPATIENT
Start: 2019-08-08 | End: 2020-01-31 | Stop reason: SDUPTHER

## 2019-08-08 RX ORDER — POLYMYXIN B SULFATE AND TRIMETHOPRIM 1; 10000 MG/ML; [USP'U]/ML
SOLUTION OPHTHALMIC
Refills: 0 | COMMUNITY
Start: 2019-06-13 | End: 2019-08-08 | Stop reason: ALTCHOICE

## 2019-08-08 RX ORDER — FLUOXETINE HYDROCHLORIDE 40 MG/1
40 CAPSULE ORAL DAILY
Qty: 90 CAPSULE | Refills: 1 | Status: SHIPPED | OUTPATIENT
Start: 2019-08-08 | End: 2020-01-31 | Stop reason: SDUPTHER

## 2019-08-08 NOTE — PROGRESS NOTES
Pt does not wish to schedule 6 month followup visit at this time. Pt states that he will schedule through the [facility] when the time gets closer.

## 2019-08-08 NOTE — PATIENT INSTRUCTIONS
We have reviewed your prescription medications. I refilled your medicines. You seem to be doing great. Continue allegra and add flonase for about a week because of congestion behind ears. We will check your cholesterol again since triglycerides were high on exam from the insurance company.

## 2019-08-12 NOTE — PROGRESS NOTES
Subjective:       Patient ID: Carlos Alfredo is a 34 y.o. male.    Chief Complaint: Follow-up     I have reviewed the PMH,  and  for this patient. He  has a past medical history of Anxiety, Depression, Psychiatric problem, and Therapy.  The patient presents today for follow-up of chronic conditions.   He has been doing well on his crestor. He is not having any problems. He is also taking fluoxetine and he feels that it is working well. He had a health screening at work recenly and he showed me the results on his phone. His labs looked pretty good. He feels that he still needs the prozac -- it takes the edge off and keeps his mood staeady without making him feel like a zombie. He takes allegra for allergies rather than flonase. His BP was good at 122/82.     Active Ambulatory Problems     Diagnosis Date Noted    Depression, major, recurrent, in complete remission 01/08/2015    Hyperlipidemia, mixed 02/19/2015    Family history of diabetes mellitus in mother 04/10/2018    Abnormal liver function tests 05/08/2018    Long-term use of high-risk medication 11/08/2018    BMI 24.0-24.9, adult 08/08/2019     Resolved Ambulatory Problems     Diagnosis Date Noted    Need for Tdap vaccination 03/30/2017     Past Medical History:   Diagnosis Date    Anxiety     Depression     Psychiatric problem     Therapy          MEDICATIONS:  Current Outpatient Medications:     FLUoxetine 40 MG capsule, Take 1 capsule (40 mg total) by mouth once daily., Disp: 90 capsule, Rfl: 1    rosuvastatin (CRESTOR) 10 MG tablet, Take 1 tablet (10 mg total) by mouth once daily., Disp: 90 tablet, Rfl: 1    fluticasone (FLONASE) 50 mcg/actuation nasal spray, 2 sprays by Each Nare route 2 (two) times daily., Disp: 1 Bottle, Rfl: 5      HEALTH MAINTENANCE:   Health Maintenance   Topic Date Due    Lipid Panel  08/08/2024    TETANUS VACCINE  03/30/2027       Review of Systems   Constitutional: Negative for activity change, chills, fatigue,  fever and unexpected weight change.   HENT: Negative for congestion, ear discharge, ear pain, hearing loss, rhinorrhea, sore throat and trouble swallowing.    Eyes: Negative for discharge, redness, itching and visual disturbance.   Respiratory: Negative for cough, chest tightness, shortness of breath and wheezing.    Cardiovascular: Negative for chest pain, palpitations and leg swelling.   Gastrointestinal: Negative for abdominal pain, blood in stool, constipation, diarrhea, nausea and vomiting.   Endocrine: Negative for cold intolerance, heat intolerance, polydipsia and polyuria.   Genitourinary: Negative for difficulty urinating, dysuria, flank pain, frequency, hematuria and urgency.   Musculoskeletal: Negative for arthralgias, back pain, joint swelling, myalgias and neck pain.   Skin: Negative for color change and rash.   Neurological: Negative for dizziness, tremors, weakness, numbness and headaches.   Psychiatric/Behavioral: Negative for confusion, dysphoric mood and sleep disturbance. The patient is not nervous/anxious.        Objective:          A1C:      CBC:  Recent Labs   Lab 11/08/18  0925   WBC 3.46 L   RBC 5.00   Hemoglobin 16.0   Hematocrit 45.8   Platelets 214   Mean Corpuscular Volume 92   Mean Corpuscular Hemoglobin 32.0 H   Mean Corpuscular Hemoglobin Conc 34.9     CMP:  Recent Labs   Lab 04/10/18  1006 05/09/18  0832 11/08/18  0925   Glucose 91  --  91   Calcium 9.6  --  9.7   Albumin 4.5 4.6 4.9   Total Protein 7.7 7.6 7.7   Sodium 141  --  141   Potassium 4.4  --  4.4   CO2 28  --  29   Chloride 102  --  104   BUN, Bld 16  --  15   Creatinine 1.08  --  0.98   Alkaline Phosphatase 75 75 79   ALT 73 H 58 H 25   AST 56 H 53 H 38   Total Bilirubin 2.3 H 1.6 H 2.1 H     LIPIDS:  Recent Labs   Lab 04/10/18  1006 05/09/18  0832 11/08/18  0925 08/08/19  0844   HDL 38 L 36 L 36 L 40   Cholesterol 252 H 128 114 L 158   Triglycerides 257 H 196 H 153 H 283 H   LDL Cholesterol 162.6 H 52.8 L 47.4 L 61.4 L    Hdl/Cholesterol Ratio 15.1 L 28.1 31.6 25.3   Non-HDL Cholesterol 214 92 78 118   Total Cholesterol/HDL Ratio 6.6 H 3.6 3.2 4.0     TSH:            Physical Exam   Constitutional: He appears well-developed and well-nourished. He does not have a sickly appearance.   HENT:   Head: Normocephalic and atraumatic.   Right Ear: External ear normal. Tympanic membrane is not erythematous. A middle ear effusion is present.   Left Ear: External ear normal. Tympanic membrane is not erythematous. A middle ear effusion is present.   Nose: No rhinorrhea. Right sinus exhibits no maxillary sinus tenderness and no frontal sinus tenderness. Left sinus exhibits no maxillary sinus tenderness and no frontal sinus tenderness.   Mouth/Throat: Posterior oropharyngeal erythema present. No posterior oropharyngeal edema. No tonsillar exudate.   Eyes: Pupils are equal, round, and reactive to light. Conjunctivae and EOM are normal. Right eye exhibits no discharge. Left eye exhibits no discharge. Right conjunctiva is not injected. Left conjunctiva is not injected.   Neck: No thyromegaly present.   Cardiovascular: Normal rate, regular rhythm, normal heart sounds and intact distal pulses.   No murmur heard.  Pulmonary/Chest: Effort normal and breath sounds normal. He has no wheezes. He has no rhonchi. He has no rales.   Abdominal: Bowel sounds are normal. He exhibits no distension. There is no tenderness.   Musculoskeletal: He exhibits no edema or tenderness.   Lymphadenopathy:     He has no cervical adenopathy.   Neurological: He displays normal reflexes. No cranial nerve deficit.   Skin: Skin is warm and dry. No lesion and no rash noted.   Psychiatric: He has a normal mood and affect. His behavior is normal. His mood appears not anxious. His speech is not rapid and/or pressured. He is not agitated and not aggressive. He does not exhibit a depressed mood.             Assessment and Plan:     Problem List Items Addressed This Visit         Psychiatric    Depression, major, recurrent, in complete remission - continue fluoxetine. Effective.        Cardiac/Vascular    Hyperlipidemia, mixed - Primary (Chronic) - continue crestor and check labs.     Relevant Orders    Lipid panel (Completed)       Endocrine    BMI 24.0-24.9, adult - stable.        Other    Long-term use of high-risk medication - we will check labs.       Other Visit Diagnoses     Allergic rhinitis, unspecified seasonality, unspecified trigger     - continue allegra and use flonase for a few weeks to clear up congestion in ears.       Bilateral acute serous otitis media, recurrence not specified    - continue allegra and use flonase to clear up congestion in ears.           Orders Placed This Encounter    Lipid panel    rosuvastatin (CRESTOR) 10 MG tablet    FLUoxetine 40 MG capsule         Follow-up with me in 6 months.       Lane Gamble MD,  FACP  Internal Medicine

## 2020-01-16 ENCOUNTER — PATIENT OUTREACH (OUTPATIENT)
Dept: ADMINISTRATIVE | Facility: HOSPITAL | Age: 35
End: 2020-01-16

## 2020-01-31 ENCOUNTER — OFFICE VISIT (OUTPATIENT)
Dept: FAMILY MEDICINE | Facility: CLINIC | Age: 35
End: 2020-01-31
Payer: COMMERCIAL

## 2020-01-31 VITALS
WEIGHT: 162.88 LBS | DIASTOLIC BLOOD PRESSURE: 74 MMHG | HEART RATE: 75 BPM | SYSTOLIC BLOOD PRESSURE: 120 MMHG | HEIGHT: 68 IN | OXYGEN SATURATION: 98 % | TEMPERATURE: 98 F | BODY MASS INDEX: 24.69 KG/M2

## 2020-01-31 DIAGNOSIS — E78.2 HYPERLIPIDEMIA, MIXED: Primary | Chronic | ICD-10-CM

## 2020-01-31 DIAGNOSIS — Z83.3 FAMILY HISTORY OF DIABETES MELLITUS IN MOTHER: ICD-10-CM

## 2020-01-31 DIAGNOSIS — H65.01 RIGHT ACUTE SEROUS OTITIS MEDIA, RECURRENCE NOT SPECIFIED: ICD-10-CM

## 2020-01-31 DIAGNOSIS — Z79.899 LONG-TERM USE OF HIGH-RISK MEDICATION: ICD-10-CM

## 2020-01-31 DIAGNOSIS — F33.42 DEPRESSION, MAJOR, RECURRENT, IN COMPLETE REMISSION: ICD-10-CM

## 2020-01-31 PROCEDURE — 99999 PR PBB SHADOW E&M-EST. PATIENT-LVL IV: ICD-10-PCS | Mod: PBBFAC,,, | Performed by: INTERNAL MEDICINE

## 2020-01-31 PROCEDURE — 3008F PR BODY MASS INDEX (BMI) DOCUMENTED: ICD-10-PCS | Mod: CPTII,S$GLB,, | Performed by: INTERNAL MEDICINE

## 2020-01-31 PROCEDURE — 99999 PR PBB SHADOW E&M-EST. PATIENT-LVL IV: CPT | Mod: PBBFAC,,, | Performed by: INTERNAL MEDICINE

## 2020-01-31 PROCEDURE — 99214 OFFICE O/P EST MOD 30 MIN: CPT | Mod: S$GLB,,, | Performed by: INTERNAL MEDICINE

## 2020-01-31 PROCEDURE — 3008F BODY MASS INDEX DOCD: CPT | Mod: CPTII,S$GLB,, | Performed by: INTERNAL MEDICINE

## 2020-01-31 PROCEDURE — 99214 PR OFFICE/OUTPT VISIT, EST, LEVL IV, 30-39 MIN: ICD-10-PCS | Mod: S$GLB,,, | Performed by: INTERNAL MEDICINE

## 2020-01-31 RX ORDER — FLUOXETINE HYDROCHLORIDE 40 MG/1
40 CAPSULE ORAL DAILY
Qty: 90 CAPSULE | Refills: 1 | Status: SHIPPED | OUTPATIENT
Start: 2020-01-31 | End: 2020-08-20 | Stop reason: SDUPTHER

## 2020-01-31 RX ORDER — ROSUVASTATIN CALCIUM 10 MG/1
10 TABLET, COATED ORAL DAILY
Qty: 90 TABLET | Refills: 1 | Status: SHIPPED | OUTPATIENT
Start: 2020-01-31 | End: 2020-08-20 | Stop reason: SDUPTHER

## 2020-01-31 NOTE — PROGRESS NOTES
Subjective:       Patient ID: Carlos Alfredo is a 34 y.o. male.    Chief Complaint: Follow-up (for medication )     I have reviewed the PMH,  and FH for this patient    He  has a past medical history of Anxiety, Depression, Psychiatric problem, and Therapy.     The patient presents today for follow-up of chronic conditions.  The patient reports that he is doing well.  He is taking fluoxetine daily.  He reports that he had a lot of anxiety in childhood and the fluoxetine seems to help.  He would like to continue the medicine.  He feels that he is sleeping better and his mood is good.  He is not gaining too much weight.  His BMI is still 24.  He is taking cholesterol medicine.  He is on Crestor and he is not having any side effects from it.  His last LDL looked good at 61.  He has been having some allergy issues.  He has been taking Allegra over-the-counter.  He is not having sore throat or fever.    The patient denies chest pain, shortness of breath, nausea, or dizziness.     Active Ambulatory Problems     Diagnosis Date Noted    Depression, major, recurrent, in complete remission 01/08/2015    Hyperlipidemia, mixed 02/19/2015    Family history of diabetes mellitus in mother 04/10/2018    Abnormal liver function tests 05/08/2018    Long-term use of high-risk medication 11/08/2018    BMI 24.0-24.9, adult 08/08/2019     Resolved Ambulatory Problems     Diagnosis Date Noted    Need for Tdap vaccination 03/30/2017     Past Medical History:   Diagnosis Date    Anxiety     Depression     Psychiatric problem     Therapy          MEDICATIONS:  Current Outpatient Medications:     FLUoxetine 40 MG capsule, Take 1 capsule (40 mg total) by mouth once daily., Disp: 90 capsule, Rfl: 1    fluticasone (FLONASE) 50 mcg/actuation nasal spray, 2 sprays by Each Nare route 2 (two) times daily., Disp: 1 Bottle, Rfl: 5    rosuvastatin (CRESTOR) 10 MG tablet, Take 1 tablet (10 mg total) by mouth once daily., Disp: 90  tablet, Rfl: 1      HEALTH MAINTENANCE:   Health Maintenance   Topic Date Due    Lipid Panel  08/08/2024    TETANUS VACCINE  03/30/2027       Review of Systems   Constitutional: Negative for activity change, chills, fatigue, fever and unexpected weight change.   HENT: Positive for rhinorrhea. Negative for congestion, ear discharge, ear pain, hearing loss, sore throat and trouble swallowing.    Eyes: Negative for discharge, redness, itching and visual disturbance.   Respiratory: Negative for cough, chest tightness, shortness of breath and wheezing.    Cardiovascular: Negative for chest pain, palpitations and leg swelling.   Gastrointestinal: Negative for abdominal pain, blood in stool, constipation, diarrhea, nausea and vomiting.   Endocrine: Negative for cold intolerance, heat intolerance, polydipsia and polyuria.   Genitourinary: Negative for difficulty urinating, dysuria, flank pain, frequency, hematuria and urgency.   Musculoskeletal: Negative for arthralgias, back pain, joint swelling, myalgias and neck pain.   Skin: Negative for color change and rash.   Neurological: Negative for dizziness, tremors, weakness, numbness and headaches.   Psychiatric/Behavioral: Negative for confusion, dysphoric mood and sleep disturbance. The patient is not nervous/anxious.        Objective:          A1C:      CBC:  Recent Labs   Lab 11/08/18  0925   WBC 3.46 L   RBC 5.00   Hemoglobin 16.0   Hematocrit 45.8   Platelets 214   Mean Corpuscular Volume 92   Mean Corpuscular Hemoglobin 32.0 H   Mean Corpuscular Hemoglobin Conc 34.9     CMP:  Recent Labs   Lab 04/10/18  1006 05/09/18  0832 11/08/18  0925   Glucose 91  --  91   Calcium 9.6  --  9.7   Albumin 4.5 4.6 4.9   Total Protein 7.7 7.6 7.7   Sodium 141  --  141   Potassium 4.4  --  4.4   CO2 28  --  29   Chloride 102  --  104   BUN, Bld 16  --  15   Creatinine 1.08  --  0.98   Alkaline Phosphatase 75 75 79   ALT 73 H 58 H 25   AST 56 H 53 H 38   Total Bilirubin 2.3 H 1.6 H 2.1  H     LIPIDS:  Recent Labs   Lab 04/10/18  1006 05/09/18  0832 11/08/18  0925 08/08/19  0844   HDL 38 L 36 L 36 L 40   Cholesterol 252 H 128 114 L 158   Triglycerides 257 H 196 H 153 H 283 H   LDL Cholesterol 162.6 H 52.8 L 47.4 L 61.4 L   Hdl/Cholesterol Ratio 15.1 L 28.1 31.6 25.3   Non-HDL Cholesterol 214 92 78 118   Total Cholesterol/HDL Ratio 6.6 H 3.6 3.2 4.0     TSH:            Physical Exam   Constitutional: He appears well-developed and well-nourished. He does not have a sickly appearance.   HENT:   Head: Normocephalic and atraumatic.   Right Ear: External ear normal. Tympanic membrane is retracted. Tympanic membrane is not erythematous. A middle ear effusion is present.   Left Ear: External ear normal. Tympanic membrane is not erythematous. A middle ear effusion is present.   Nose: No rhinorrhea. Right sinus exhibits no maxillary sinus tenderness and no frontal sinus tenderness. Left sinus exhibits no maxillary sinus tenderness and no frontal sinus tenderness.   Mouth/Throat: No posterior oropharyngeal edema or posterior oropharyngeal erythema. No tonsillar exudate.   Eyes: Pupils are equal, round, and reactive to light. Conjunctivae and EOM are normal. Right eye exhibits no discharge. Left eye exhibits no discharge. Right conjunctiva is not injected. Left conjunctiva is not injected.   Neck: No thyromegaly present.   Cardiovascular: Normal rate, regular rhythm, normal heart sounds and intact distal pulses.   No murmur heard.  Pulmonary/Chest: Effort normal and breath sounds normal. He has no wheezes. He has no rhonchi. He has no rales.   Abdominal: Bowel sounds are normal. He exhibits no distension. There is no tenderness.   Musculoskeletal: He exhibits no edema or tenderness.   Lymphadenopathy:     He has no cervical adenopathy.   Neurological: He displays normal reflexes. No cranial nerve deficit.   Skin: Skin is warm and dry. No lesion and no rash noted.   Psychiatric: He has a normal mood and affect.  His behavior is normal. His mood appears not anxious. His speech is not rapid and/or pressured. He is not agitated and not aggressive. He does not exhibit a depressed mood.             Assessment and Plan:     Problem List Items Addressed This Visit        Psychiatric    Depression, major, recurrent, in complete remission- continue fluoxetine.  We will check labs.       Cardiac/Vascular    Hyperlipidemia, mixed - Primary (Chronic)- stable.  Continue Crestor.  Check labs.    Relevant Orders    Comprehensive metabolic panel    Lipid panel       Other    Family history of diabetes mellitus in mother- we will check blood sugar.      Long-term use of high-risk medication- we will check labs.      Other Visit Diagnoses     Right acute serous otitis media, recurrence not specified    - the ears not bothering him at this time.  He can continue Allegra.  If it starts bothering him I can call in an antibiotic.    Relevant Orders    CBC auto differential          Orders Placed This Encounter    CBC auto differential    Comprehensive metabolic panel    Lipid panel    FLUoxetine 40 MG capsule    rosuvastatin (CRESTOR) 10 MG tablet         Follow-up with me in 6 months.       Lane Gamble MD,  FACP  Internal Medicine

## 2020-02-15 ENCOUNTER — PATIENT MESSAGE (OUTPATIENT)
Dept: FAMILY MEDICINE | Facility: CLINIC | Age: 35
End: 2020-02-15

## 2020-02-17 ENCOUNTER — PATIENT MESSAGE (OUTPATIENT)
Dept: FAMILY MEDICINE | Facility: CLINIC | Age: 35
End: 2020-02-17

## 2020-02-17 RX ORDER — CEFDINIR 300 MG/1
300 CAPSULE ORAL 2 TIMES DAILY
Qty: 20 CAPSULE | Refills: 0 | Status: SHIPPED | OUTPATIENT
Start: 2020-02-17 | End: 2020-02-27

## 2020-08-17 ENCOUNTER — TELEPHONE (OUTPATIENT)
Dept: FAMILY MEDICINE | Facility: CLINIC | Age: 35
End: 2020-08-17

## 2020-08-17 NOTE — TELEPHONE ENCOUNTER
----- Message from Kimberly Arias sent at 8/17/2020  9:42 AM CDT -----  Regarding: returning a call  Contact: [phone]/self  Type:  Patient Returning Call    Who Called: self   Who Left Message for Patient: saulo  Does the patient know what this is regarding?: appt  Would the patient rather a call back or a response via [facility]?  call  Best Call Back Number: [phone]/self  Additional Information:

## 2020-08-20 ENCOUNTER — OFFICE VISIT (OUTPATIENT)
Dept: FAMILY MEDICINE | Facility: CLINIC | Age: 35
End: 2020-08-20
Payer: COMMERCIAL

## 2020-08-20 VITALS
DIASTOLIC BLOOD PRESSURE: 72 MMHG | HEIGHT: 68 IN | SYSTOLIC BLOOD PRESSURE: 128 MMHG | OXYGEN SATURATION: 97 % | BODY MASS INDEX: 26.02 KG/M2 | HEART RATE: 70 BPM | RESPIRATION RATE: 18 BRPM | WEIGHT: 171.69 LBS | TEMPERATURE: 98 F

## 2020-08-20 DIAGNOSIS — F33.42 DEPRESSION, MAJOR, RECURRENT, IN COMPLETE REMISSION: ICD-10-CM

## 2020-08-20 DIAGNOSIS — R79.89 ABNORMAL LIVER FUNCTION TESTS: ICD-10-CM

## 2020-08-20 DIAGNOSIS — Z00.00 BLOOD TESTS FOR ROUTINE GENERAL PHYSICAL EXAMINATION: ICD-10-CM

## 2020-08-20 DIAGNOSIS — H65.03 BILATERAL ACUTE SEROUS OTITIS MEDIA, RECURRENCE NOT SPECIFIED: ICD-10-CM

## 2020-08-20 DIAGNOSIS — E78.2 HYPERLIPIDEMIA, MIXED: Primary | ICD-10-CM

## 2020-08-20 PROCEDURE — 3008F PR BODY MASS INDEX (BMI) DOCUMENTED: ICD-10-PCS | Mod: CPTII,S$GLB,, | Performed by: INTERNAL MEDICINE

## 2020-08-20 PROCEDURE — 3008F BODY MASS INDEX DOCD: CPT | Mod: CPTII,S$GLB,, | Performed by: INTERNAL MEDICINE

## 2020-08-20 PROCEDURE — 99999 PR PBB SHADOW E&M-EST. PATIENT-LVL IV: CPT | Mod: PBBFAC,,, | Performed by: INTERNAL MEDICINE

## 2020-08-20 PROCEDURE — 99214 OFFICE O/P EST MOD 30 MIN: CPT | Mod: S$GLB,,, | Performed by: INTERNAL MEDICINE

## 2020-08-20 PROCEDURE — 99214 PR OFFICE/OUTPT VISIT, EST, LEVL IV, 30-39 MIN: ICD-10-PCS | Mod: S$GLB,,, | Performed by: INTERNAL MEDICINE

## 2020-08-20 PROCEDURE — 99999 PR PBB SHADOW E&M-EST. PATIENT-LVL IV: ICD-10-PCS | Mod: PBBFAC,,, | Performed by: INTERNAL MEDICINE

## 2020-08-20 RX ORDER — FLUOXETINE HYDROCHLORIDE 40 MG/1
40 CAPSULE ORAL DAILY
Qty: 90 CAPSULE | Refills: 1 | Status: SHIPPED | OUTPATIENT
Start: 2020-08-20

## 2020-08-20 RX ORDER — CEFDINIR 300 MG/1
300 CAPSULE ORAL 2 TIMES DAILY
Qty: 20 CAPSULE | Refills: 0 | Status: SHIPPED | OUTPATIENT
Start: 2020-08-20 | End: 2020-08-30

## 2020-08-20 RX ORDER — ROSUVASTATIN CALCIUM 10 MG/1
10 TABLET, COATED ORAL DAILY
Qty: 90 TABLET | Refills: 1 | Status: SHIPPED | OUTPATIENT
Start: 2020-08-20

## 2020-08-20 NOTE — PATIENT INSTRUCTIONS
We have reviewed your prescription medications.   We will order routine labs.   I am sending you some cefdinir for ear infection.  I refilled your medicines.  Follow-up in 6 months.

## 2020-08-23 NOTE — PROGRESS NOTES
Subjective:       Patient ID: Carlos Alfredo is a 35 y.o. male.    Chief Complaint: Follow-up     I have reviewed the PMH,  and  for this patient    He  has a past medical history of Anxiety, Depression, Psychiatric problem, and Therapy.     The patient presents today for follow-up of chronic conditions.  The patient is here for routine follow-up.  He needs refills of his fluoxetine.  He feels that it is still working.  He is also on Crestor.  His last blood work looks good.  He is not having any complications or side effects from the Crestor.  His blood counts and blood chemistries are normal.  His liver tests looked good.  His cholesterol was good with an LDL of 64.  He had an ear infection last time which he thinks got better.  He does not have any complaints of ear pain or dizziness.    Active Ambulatory Problems     Diagnosis Date Noted    Depression, major, recurrent, in complete remission 01/08/2015    Hyperlipidemia, mixed 02/19/2015    Family history of diabetes mellitus in mother 04/10/2018    Abnormal liver function tests 05/08/2018    Long-term use of high-risk medication 11/08/2018    BMI 24.0-24.9, adult 08/08/2019    BMI 26.0-26.9,adult 08/20/2020     Resolved Ambulatory Problems     Diagnosis Date Noted    Need for Tdap vaccination 03/30/2017     Past Medical History:   Diagnosis Date    Anxiety     Depression     Psychiatric problem     Therapy          MEDICATIONS:  Current Outpatient Medications:     FLUoxetine 40 MG capsule, Take 1 capsule (40 mg total) by mouth once daily., Disp: 90 capsule, Rfl: 1    rosuvastatin (CRESTOR) 10 MG tablet, Take 1 tablet (10 mg total) by mouth once daily., Disp: 90 tablet, Rfl: 1    cefdinir (OMNICEF) 300 MG capsule, Take 1 capsule (300 mg total) by mouth 2 (two) times daily. for 10 days, Disp: 20 capsule, Rfl: 0    fluticasone (FLONASE) 50 mcg/actuation nasal spray, 2 sprays by Each Nare route 2 (two) times daily. (Patient not taking: Reported  on 8/20/2020), Disp: 1 Bottle, Rfl: 5      HEALTH MAINTENANCE:   Health Maintenance   Topic Date Due    Lipid Panel  08/20/2025    TETANUS VACCINE  03/30/2027    Hepatitis C Screening  Completed       Review of Systems   Constitutional: Negative for chills, fatigue and fever.   HENT: Negative for congestion, ear discharge, ear pain, rhinorrhea and sore throat.    Eyes: Negative for discharge, redness and itching.   Respiratory: Negative for cough, chest tightness, shortness of breath and wheezing.    Cardiovascular: Negative for chest pain, palpitations and leg swelling.   Gastrointestinal: Negative for abdominal pain, constipation, diarrhea, nausea and vomiting.   Endocrine: Negative for cold intolerance and heat intolerance.   Genitourinary: Negative for dysuria, flank pain, frequency and hematuria.   Musculoskeletal: Negative for arthralgias, back pain, joint swelling and myalgias.   Skin: Negative for color change and rash.   Neurological: Negative for dizziness, tremors, numbness and headaches.   Psychiatric/Behavioral: Negative for dysphoric mood and sleep disturbance. The patient is not nervous/anxious.        Objective:          A1C:      CBC:  Recent Labs   Lab 11/08/18  0925 01/31/20  0836 08/20/20  1637   WBC 3.46 L 3.56 L 4.72   RBC 5.00 4.99 4.76   Hemoglobin 16.0 15.8 15.4   Hematocrit 45.8 46.6 44.0   Platelets 214 217 213   Mean Corpuscular Volume 92 93 92   Mean Corpuscular Hemoglobin 32.0 H 31.7 H 32.4 H   Mean Corpuscular Hemoglobin Conc 34.9 33.9 35.0     CMP:  Recent Labs   Lab 04/10/18  1006 05/09/18  0832 11/08/18  0925 01/31/20  0836 08/20/20  1637   Glucose 91  --  91 98 84   Calcium 9.6  --  9.7 9.2 9.6   Albumin 4.5 4.6 4.9 4.7 5.0   Total Protein 7.7 7.6 7.7 7.6 8.1   Sodium 141  --  141 143 144   Potassium 4.4  --  4.4 4.3 4.1   CO2 28  --  29 28 29   Chloride 102  --  104 104 103   BUN, Bld 16  --  15 17 16   Creatinine 1.08  --  0.98 0.97 0.98   Alkaline Phosphatase 75 75 79 75 64    ALT 73 H 58 H 25 32 49 H   AST 56 H 53 H 38 42 55 H   Total Bilirubin 2.3 H 1.6 H 2.1 H 1.6 H 2.3 H     LIPIDS:  Recent Labs   Lab 04/10/18  1006 05/09/18  0832 11/08/18  0925 08/08/19  0844 01/31/20  0836 08/20/20  1637   HDL 38 L 36 L 36 L 40 39 L 40   Cholesterol 252 H 128 114 L 158 168 182   Triglycerides 257 H 196 H 153 H 283 H 325 H 324 H   LDL Cholesterol 162.6 H 52.8 L 47.4 L 61.4 L 64.0 77.2   Hdl/Cholesterol Ratio 15.1 L 28.1 31.6 25.3 23.2 22.0   Non-HDL Cholesterol 214 92 78 118 129 142   Total Cholesterol/HDL Ratio 6.6 H 3.6 3.2 4.0 4.3 4.6     TSH:            Physical Exam  Constitutional:       Appearance: He is well-developed.   HENT:      Head: Normocephalic and atraumatic.      Right Ear: External ear normal. A middle ear effusion is present. Tympanic membrane is erythematous.      Left Ear: External ear normal. A middle ear effusion is present. Tympanic membrane is erythematous.      Nose: No rhinorrhea.      Right Sinus: No maxillary sinus tenderness or frontal sinus tenderness.      Left Sinus: No maxillary sinus tenderness or frontal sinus tenderness.      Mouth/Throat:      Pharynx: No posterior oropharyngeal erythema.      Tonsils: No tonsillar exudate.   Eyes:      General:         Right eye: No discharge.         Left eye: No discharge.      Conjunctiva/sclera: Conjunctivae normal.      Right eye: Right conjunctiva is not injected.      Left eye: Left conjunctiva is not injected.      Pupils: Pupils are equal, round, and reactive to light.   Neck:      Thyroid: No thyromegaly.   Cardiovascular:      Rate and Rhythm: Normal rate and regular rhythm.      Heart sounds: Normal heart sounds. No murmur.   Pulmonary:      Effort: Pulmonary effort is normal.      Breath sounds: Normal breath sounds. No wheezing, rhonchi or rales.   Abdominal:      General: Bowel sounds are normal. There is no distension.      Tenderness: There is no abdominal tenderness.   Musculoskeletal:         General: No  tenderness.   Lymphadenopathy:      Cervical: No cervical adenopathy.   Skin:     General: Skin is warm and dry.      Findings: No lesion or rash.   Neurological:      Cranial Nerves: No cranial nerve deficit.      Deep Tendon Reflexes: Reflexes normal.   Psychiatric:         Mood and Affect: Mood is not anxious or depressed.         Speech: Speech is not rapid and pressured.         Behavior: Behavior normal. Behavior is not agitated or aggressive.               Assessment and Plan:     Problem List Items Addressed This Visit        Psychiatric    Depression, major, recurrent, in complete remission - continue fluoxetine.  It seems to help.  Exercise is important.         Cardiac/Vascular    Hyperlipidemia, mixed (Chronic) - continue Crestor.  No side effects.  Cholesterol looked good.         Endocrine    BMI 26.0-26.9,adult - weight is stable.  Continue working on diet.       GI    Abnormal liver function tests - we will follow-up on abnormal liver tests.  His last liver tests were normal.  He lost weight which seemed to help.      Other Visit Diagnoses     Blood tests for routine general physical examination    -  Primary - we will check labs.    Relevant Orders    Lipid Panel (Completed)    CBC auto differential (Completed)    Comprehensive metabolic panel (Completed)    Bilateral acute serous otitis media, recurrence not specified    - ears were infected.  We will treat with cefdinir.  He was apparently asymptomatic.          Orders Placed This Encounter    Lipid Panel    CBC auto differential    Comprehensive metabolic panel    cefdinir (OMNICEF) 300 MG capsule    FLUoxetine 40 MG capsule    rosuvastatin (CRESTOR) 10 MG tablet         Follow-up  in 6 months.       Lane Gamble MD,  FACP  Internal Medicine

## 2021-04-11 ENCOUNTER — IMMUNIZATION (OUTPATIENT)
Dept: PRIMARY CARE CLINIC | Facility: CLINIC | Age: 36
End: 2021-04-11
Payer: COMMERCIAL

## 2021-04-11 DIAGNOSIS — Z23 NEED FOR VACCINATION: Primary | ICD-10-CM

## 2021-04-11 PROCEDURE — 91300 PR SARS-COV- 2 COVID-19 VACCINE, NO PRSV, 30MCG/0.3ML, IM: ICD-10-PCS | Mod: S$GLB,,, | Performed by: INTERNAL MEDICINE

## 2021-04-11 PROCEDURE — 0001A PR IMMUNIZ ADMIN, SARS-COV-2 COVID-19 VACC, 30MCG/0.3ML, 1ST DOSE: CPT | Mod: CV19,S$GLB,, | Performed by: INTERNAL MEDICINE

## 2021-04-11 PROCEDURE — 0001A PR IMMUNIZ ADMIN, SARS-COV-2 COVID-19 VACC, 30MCG/0.3ML, 1ST DOSE: ICD-10-PCS | Mod: CV19,S$GLB,, | Performed by: INTERNAL MEDICINE

## 2021-04-11 PROCEDURE — 91300 PR SARS-COV- 2 COVID-19 VACCINE, NO PRSV, 30MCG/0.3ML, IM: CPT | Mod: S$GLB,,, | Performed by: INTERNAL MEDICINE

## 2021-04-11 RX ADMIN — Medication 0.3 ML: at 05:04

## 2021-05-01 ENCOUNTER — IMMUNIZATION (OUTPATIENT)
Dept: PRIMARY CARE CLINIC | Facility: CLINIC | Age: 36
End: 2021-05-01

## 2021-05-01 DIAGNOSIS — Z23 NEED FOR VACCINATION: Primary | ICD-10-CM

## 2021-05-01 PROCEDURE — 0002A PR IMMUNIZ ADMIN, SARS-COV-2 COVID-19 VACC, 30MCG/0.3ML, 2ND DOSE: ICD-10-PCS | Mod: CV19,S$GLB,, | Performed by: INTERNAL MEDICINE

## 2021-05-01 PROCEDURE — 0002A PR IMMUNIZ ADMIN, SARS-COV-2 COVID-19 VACC, 30MCG/0.3ML, 2ND DOSE: CPT | Mod: CV19,S$GLB,, | Performed by: INTERNAL MEDICINE

## 2021-05-01 PROCEDURE — 91300 PR SARS-COV- 2 COVID-19 VACCINE, NO PRSV, 30MCG/0.3ML, IM: ICD-10-PCS | Mod: S$GLB,,, | Performed by: INTERNAL MEDICINE

## 2021-05-01 PROCEDURE — 91300 PR SARS-COV- 2 COVID-19 VACCINE, NO PRSV, 30MCG/0.3ML, IM: CPT | Mod: S$GLB,,, | Performed by: INTERNAL MEDICINE

## 2021-05-01 RX ADMIN — Medication 0.3 ML: at 10:05

## 2023-08-11 ENCOUNTER — PATIENT MESSAGE (OUTPATIENT)
Dept: ADMINISTRATIVE | Facility: HOSPITAL | Age: 38
End: 2023-08-11
Payer: COMMERCIAL